# Patient Record
Sex: MALE | Race: WHITE
[De-identification: names, ages, dates, MRNs, and addresses within clinical notes are randomized per-mention and may not be internally consistent; named-entity substitution may affect disease eponyms.]

---

## 2023-08-31 ENCOUNTER — HOSPITAL ENCOUNTER (OUTPATIENT)
Dept: HOSPITAL 47 - RADUSWWP | Age: 38
Discharge: HOME | End: 2023-08-31
Attending: FAMILY MEDICINE
Payer: OTHER GOVERNMENT

## 2023-08-31 DIAGNOSIS — N50.819: ICD-10-CM

## 2023-08-31 DIAGNOSIS — I86.1: Primary | ICD-10-CM

## 2023-08-31 PROCEDURE — 76870 US EXAM SCROTUM: CPT

## 2023-08-31 PROCEDURE — 93975 VASCULAR STUDY: CPT

## 2023-08-31 NOTE — US
EXAMINATION TYPE: US scrotum with doppler.  Grayscale and color Doppler Duplex imaging performed of ashtyn mejia scrotum.

 

DATE OF EXAM: 8/31/2023

 

COMPARISON: NONE

 

CLINICAL INDICATION: Male, 38 years old with history of N50.819 TESTICULAR PAIN; Pain

 

EXAM MEASUREMENTS:

 

TESTICLES:

Right Testicle:  4.8 x 2.4 x 3.5  cm

Left Testicle:  5.3 x 3.1 x 3.2 cm

 

EPIDIDYMIS HEAD:

Right Epididymis:  1.2 x 1.0 x .9  cm

Left Epididymis:  1.3 x 1.2 x 1.2  cm  

 

Doppler performed to assess for testicular vascularity; good bilateral color flow and waveforms are s
een.   There is no evidence of testicular torsion.

 

Presence of hydroceles:  no

Presence of varicoceles:  Yes bilaterally

 

IMPRESSION: 

1. No evidence of testicular torsion or intratesticular mass.

2. Bilateral varicoceles.

## 2025-05-14 ENCOUNTER — HOSPITAL ENCOUNTER (OUTPATIENT)
Dept: HOSPITAL 47 - RADXRMAIN | Age: 40
Discharge: HOME | End: 2025-05-14
Attending: NEUROLOGICAL SURGERY
Payer: MEDICARE

## 2025-05-14 DIAGNOSIS — M47.814: Primary | ICD-10-CM

## 2025-05-14 DIAGNOSIS — R14.0: ICD-10-CM

## 2025-05-14 DIAGNOSIS — M47.26: ICD-10-CM

## 2025-05-14 PROCEDURE — 72072 X-RAY EXAM THORAC SPINE 3VWS: CPT

## 2025-05-14 PROCEDURE — 74018 RADEX ABDOMEN 1 VIEW: CPT
